# Patient Record
Sex: FEMALE | Race: WHITE | Employment: UNEMPLOYED | URBAN - NONMETROPOLITAN AREA
[De-identification: names, ages, dates, MRNs, and addresses within clinical notes are randomized per-mention and may not be internally consistent; named-entity substitution may affect disease eponyms.]

---

## 2021-12-20 ENCOUNTER — HOSPITAL ENCOUNTER (OUTPATIENT)
Age: 21
Setting detail: SPECIMEN
Discharge: HOME | End: 2021-12-20
Payer: COMMERCIAL

## 2021-12-20 ENCOUNTER — OFFICE VISIT (OUTPATIENT)
Dept: FAMILY MEDICINE CLINIC | Age: 21
End: 2021-12-20
Payer: COMMERCIAL

## 2021-12-20 VITALS
WEIGHT: 158 LBS | OXYGEN SATURATION: 98 % | TEMPERATURE: 99.1 F | DIASTOLIC BLOOD PRESSURE: 82 MMHG | SYSTOLIC BLOOD PRESSURE: 122 MMHG | RESPIRATION RATE: 14 BRPM | BODY MASS INDEX: 29.08 KG/M2 | HEIGHT: 62 IN | HEART RATE: 102 BPM

## 2021-12-20 DIAGNOSIS — J06.9 VIRAL URI: ICD-10-CM

## 2021-12-20 DIAGNOSIS — R05.9 COUGH: ICD-10-CM

## 2021-12-20 DIAGNOSIS — J06.9 VIRAL URI: Primary | ICD-10-CM

## 2021-12-20 PROBLEM — T15.01XA FOREIGN BODY OF RIGHT CORNEA: Status: ACTIVE | Noted: 2021-09-03

## 2021-12-20 LAB
INFLUENZA A ANTIBODY: NEGATIVE
INFLUENZA B ANTIBODY: NEGATIVE

## 2021-12-20 PROCEDURE — U0003 INFECTIOUS AGENT DETECTION BY NUCLEIC ACID (DNA OR RNA); SEVERE ACUTE RESPIRATORY SYNDROME CORONAVIRUS 2 (SARS-COV-2) (CORONAVIRUS DISEASE [COVID-19]), AMPLIFIED PROBE TECHNIQUE, MAKING USE OF HIGH THROUGHPUT TECHNOLOGIES AS DESCRIBED BY CMS-2020-01-R: HCPCS

## 2021-12-20 PROCEDURE — 99203 OFFICE O/P NEW LOW 30 MIN: CPT | Performed by: NURSE PRACTITIONER

## 2021-12-20 PROCEDURE — U0005 INFEC AGEN DETEC AMPLI PROBE: HCPCS

## 2021-12-20 PROCEDURE — 87804 INFLUENZA ASSAY W/OPTIC: CPT | Performed by: NURSE PRACTITIONER

## 2021-12-20 SDOH — ECONOMIC STABILITY: FOOD INSECURITY: WITHIN THE PAST 12 MONTHS, YOU WORRIED THAT YOUR FOOD WOULD RUN OUT BEFORE YOU GOT MONEY TO BUY MORE.: NEVER TRUE

## 2021-12-20 SDOH — ECONOMIC STABILITY: FOOD INSECURITY: WITHIN THE PAST 12 MONTHS, THE FOOD YOU BOUGHT JUST DIDN'T LAST AND YOU DIDN'T HAVE MONEY TO GET MORE.: NEVER TRUE

## 2021-12-20 ASSESSMENT — ENCOUNTER SYMPTOMS
DIARRHEA: 0
COUGH: 1
SORE THROAT: 1
NAUSEA: 0
RHINORRHEA: 1
VOMITING: 0

## 2021-12-20 ASSESSMENT — PATIENT HEALTH QUESTIONNAIRE - PHQ9
2. FEELING DOWN, DEPRESSED OR HOPELESS: 0
SUM OF ALL RESPONSES TO PHQ QUESTIONS 1-9: 0
1. LITTLE INTEREST OR PLEASURE IN DOING THINGS: 0
SUM OF ALL RESPONSES TO PHQ QUESTIONS 1-9: 0
SUM OF ALL RESPONSES TO PHQ9 QUESTIONS 1 & 2: 0
SUM OF ALL RESPONSES TO PHQ QUESTIONS 1-9: 0

## 2021-12-20 ASSESSMENT — SOCIAL DETERMINANTS OF HEALTH (SDOH): HOW HARD IS IT FOR YOU TO PAY FOR THE VERY BASICS LIKE FOOD, HOUSING, MEDICAL CARE, AND HEATING?: NOT HARD AT ALL

## 2021-12-20 NOTE — PROGRESS NOTES
[address], APRN-CNP  [address]  Phone:  [phone]  Fax:  [phone]  Deion Atkins is a 22 y.o. female who presents today for her medical conditions/complaints as noted below. Deion Atkins c/o of Cough (mirgaines, nasal congestion, body aches, cough, s/s started last tuesday)      HPI:     URI   This is a new problem. The current episode started in the past 7 days (12/14). The problem has been waxing and waning. There has been no fever. Associated symptoms include coughing, headaches, rhinorrhea and a sore throat. Pertinent negatives include no diarrhea, nausea or vomiting. Wt Readings from Last 3 Encounters:   12/20/21 158 lb (71.7 kg)   03/19/13 84 lb (38.1 kg) (48 %, Z= -0.06)*   11/27/12 80 lb 8 oz (36.5 kg) (46 %, Z= -0.09)*     * Growth percentiles are based on CDC (Girls, 2-20 Years) data. Temp Readings from Last 3 Encounters:   12/20/21 99.1 °F (37.3 °C)   03/19/13 97.9 °F (36.6 °C) (Oral)       BP Readings from Last 3 Encounters:   12/20/21 122/82   03/19/13 90/62 (10 %, Z = -1.28 /  55 %, Z = 0.13)*   11/27/12 102/60 (59 %, Z = 0.23 /  51 %, Z = 0.03)*     *BP percentiles are based on the 2017 AAP Clinical Practice Guideline for girls       Pulse Readings from Last 3 Encounters:   12/20/21 102   03/19/13 80   11/27/12 72        SpO2 Readings from Last 3 Encounters:   12/20/21 98%             Past Medical History:   Diagnosis Date    Abdominal pain     Allergy     Allergies    Diarrhea     Dysphagia     GERD (gastroesophageal reflux disease)     Headache(784.0)     Reflux     Vomiting       Past Surgical History:   Procedure Laterality Date    TONSILLECTOMY       History reviewed. No pertinent family history. Social History     Tobacco Use    Smoking status: Never Smoker    Smokeless tobacco: Never Used   Substance Use Topics    Alcohol use: Never      No current outpatient medications on file.      No Findings: No rash. Neurological:      Mental Status: She is alert and oriented to person, place, and time. Psychiatric:         Judgment: Judgment normal.         Assessment:      Diagnosis Orders   1. Viral URI  COVID-19   2. Cough  POCT Influenza A/B    COVID-19     No results found for this visit on 12/20/21. Influenza negative. Plan:       COVID swab was obtained. COVID work or school note given. Remain in quarantine until results are back. Please go to the emergency room if you become short of breath, have weakness or extreme fatigue or if you feel you may be dehydrated. You may call the office if it is during normal business hours and request a nurse visit where we check you pulse oximetry/oxygen level. If your level is too low you will be sent to the hospital for further treatment. You are being provided a work or school excuse so you may quarantine until you test results are back. If you are COVID positive you will be given an additional excuse to lengthen your quarantine. Practice coughing and deep breathing every hour while awake. If you are laying down, change positions frequently. Try laying on your stomach to open up your lungs more. If you are allowed to take tylenol or ibuprofen you may take these to relieve fever, chills or body aches. Follow up with primary care provider in 1 to 2 days if needed. There are no Patient Instructions on file for this visit. Patient/Caregiver instructed on use, benefit, and side effects of prescribed medications. All patient/parent/caregiver questions answered. Patient/parent/caregiver voiced understanding. Reviewed health maintenance. Instructed to continue current medications, diet and exercise. Patient agreed with treatment plan. Follow up as directed.            Electronically signed by JENNIFER Thomason NP on12/20/2021

## 2021-12-20 NOTE — PATIENT INSTRUCTIONS
COVID swab was obtained. COVID work or school note given. Remain in quarantine until results are back. Please go to the emergency room if you become short of breath, have weakness or extreme fatigue or if you feel you may be dehydrated. You may call the office if it is during normal business hours and request a nurse visit where we check you pulse oximetry/oxygen level. If your level is too low you will be sent to the hospital for further treatment. You are being provided a work or school excuse so you may quarantine until you test results are back. If you are COVID positive you will be given an additional excuse to lengthen your quarantine. Practice coughing and deep breathing every hour while awake. If you are laying down, change positions frequently. Try laying on your stomach to open up your lungs more. If you are allowed to take tylenol or ibuprofen you may take these to relieve fever, chills or body aches. Follow up with primary care provider in 1 to 2 days if needed. Patient Education        10 Things to Do When You Have COVID-19    Stay home. Don't go to school, work, or public areas. And don't use public transportation, ride-shares, or taxis unless you have no choice. Leave your home only if you need to get medical care. But call the doctor's office first so they know you're coming. And wear a mask. Ask before leaving isolation. Follow your doctor's advice about when it is safe for you to leave isolation. Wear a mask when you are around other people. It can help stop the spread of the virus. Limit contact with people in your home. If possible, stay in a separate bedroom and use a separate bathroom. Avoid contact with pets and other animals. If possible, have a friend or family member care for them while you're sick. Cover your mouth and nose with a tissue when you cough or sneeze. Then throw the tissue in the trash right away.      Wash your hands often, especially after you cough or sneeze. Use soap and water, and scrub for at least 20 seconds. If soap and water aren't available, use an alcohol-based hand . Don't share personal household items. These include bedding, towels, cups and glasses, and eating utensils. Clean and disinfect your home every day. Use household  or disinfectant wipes or sprays. If needed, take acetaminophen (Tylenol) or ibuprofen (Advil, Motrin) to relieve fever and body aches. Read and follow all instructions on the label. Current as of: March 26, 2021               Content Version: 13.0  © 6004-7885 [employer]. Care instructions adapted under license by [facility] ([facility]). If you have questions about a medical condition or this instruction, always ask your healthcare professional. [place] [address] any warranty or liability for your use of this information. Patient Education        Learning About Coronavirus [phone])  What is coronavirus (COVID-19)? COVID-19 is a disease caused by a type of coronavirus. This illness was first found in December 2019. It has since spread worldwide. Coronaviruses are a large group of viruses. They cause the common cold. They also cause more serious illnesses like [place] respiratory syndrome (MERS) and severe acute respiratory syndrome (SARS). COVID-19 is caused by a novel coronavirus. That means it's a new type that has not been seen in people before. What are the symptoms? COVID-19 symptoms may include:  · Fever. · Cough. · Trouble breathing. · Chills or repeated shaking with chills. · Muscle and body aches. · Headache. · Sore throat. · New loss of taste or smell. · Vomiting. · Diarrhea. In severe cases, COVID-19 can cause pneumonia and make it hard to breathe without help from a machine. It can cause death. How is it diagnosed?   COVID-19 is diagnosed with a viral test. This may also be called a PCR test or antigen test. It looks for evidence of the virus in your breathing passages or lungs (respiratory system). The test is most often done on a sample from the nose, throat, or lungs. It's sometimes done on a sample of saliva. One way a sample is collected is by putting a long swab into the back of your nose. How is it treated? Mild cases of COVID-19 can be treated at home. Serious cases need treatment in the hospital. Treatment may include medicines to reduce symptoms, plus breathing support such as oxygen therapy or a ventilator. Some people may be placed on their belly to help their oxygen levels. Treatments that may help people who have COVID-19 include:  Antiviral medicines. These medicines treat viral infections. Remdesivir is an example. Immune-based therapy. These medicines help the immune system fight COVID-19. Examples include monoclonal antibodies. Blood thinners. These medicines help prevent blood clots. People with severe illness are at risk for blood clots. How can you protect yourself and others? The best way to protect yourself from getting sick is to:  · Get vaccinated. · Avoid sick people. · If you are not fully vaccinated:  ? Wear a mask if you have to go to public areas. ? Avoid crowds and try to stay at least 6 feet away from other people. · Cover your mouth with a tissue when you cough or sneeze. · Wash your hands often, especially after you cough or sneeze. Use soap and water, and scrub for at least 20 seconds. If soap and water aren't available, use an alcohol-based hand . · Avoid touching your mouth, nose, and eyes. To help avoid spreading the virus to others:  · Get vaccinated. · Cover your mouth with a tissue when you cough or sneeze. · Wash your hands often, especially after you cough or sneeze. Use soap and water, and scrub for at least 20 seconds. If soap and water aren't available, use an alcohol-based hand .   · If you have been exposed to the virus and are not fully vaccinated:  ? Stay home. Don't go to school, work, or public areas. And don't use public transportation, ride-shares, or taxis unless you have no choice. ? Wear a mask if you have to go to public areas, like the pharmacy. · If you're sick:  ? Leave your home only if you need to get medical care. But call the doctor's office first so they know you're coming. And wear a mask. ? Wear a mask whenever you're around other people. ? Limit contact with pets and people in your home. If possible, stay in a separate bedroom and use a separate bathroom. ? Clean and disinfect your home every day. Use household  and disinfectant wipes or sprays. Take special care to clean things that you touch with your hands. How can you self-isolate when you have COVID-19? If you have COVID-19, there are things you can do to help avoid spreading the virus to others. · Limit contact with people in your home. If possible, stay in a separate bedroom and use a separate bathroom. · Wear a mask when you are around other people. · If you have to leave home, avoid crowds and try to stay at least 6 feet away from other people. · Avoid contact with pets and other animals. · Cover your mouth and nose with a tissue when you cough or sneeze. Then throw it in the trash right away. · Wash your hands often, especially after you cough or sneeze. Use soap and water, and scrub for at least 20 seconds. If soap and water aren't available, use an alcohol-based hand . · Don't share personal household items. These include bedding, towels, cups and glasses, and eating utensils. · [address] in the warmest water allowed for the fabric type, and dry it completely. It's okay to wash other people's laundry with yours. · Clean and disinfect your home. Use household  and disinfectant wipes or sprays. When should you call for help? Call 911 anytime you think you may need emergency care.  For example, call if you have life-threatening symptoms, such as:    · You have severe trouble breathing. (You can't talk at all.)     · You have constant chest pain or pressure.     · You are severely dizzy or lightheaded.     · You are confused or can't think clearly.     · You have pale, gray, or blue-colored skin or lips.     · You pass out (lose consciousness) or are very hard to wake up. Call your doctor now or seek immediate medical care if:    · You have moderate trouble breathing. (You can't speak a full sentence.)     · You are coughing up blood (more than about 1 teaspoon).     · You have signs of low blood pressure. These include feeling lightheaded; being too weak to stand; and having cold, pale, clammy skin. Watch closely for changes in your health, and be sure to contact your doctor if:    · Your symptoms get worse.     · You are not getting better as expected.     · You have new or worse symptoms of anxiety, depression, nightmares, or flashbacks. Call before you go to the doctor's office. Follow their instructions. And wear a mask. Current as of: July 1, 2021               Content Version: 13.0  © 2006-2021 [employer]. Care instructions adapted under license by [facility] ([facility]). If you have questions about a medical condition or this instruction, always ask your healthcare professional. [place] [address] any warranty or liability for your use of this information. Patient Education        Coronavirus (SZUEF-07): Care Instructions  Overview  The coronavirus disease (COVID-19) is caused by a virus. Symptoms may include a fever, a cough, and shortness of breath. It can spread through droplets from coughing and sneezing, breathing, and singing. The virus also can spread when people are in close contact with someone who is infected. Most people have mild symptoms and can take care of themselves at home.  If their symptoms get worse, they may need care in a hospital. Treatment may include medicines to reduce symptoms, plus breathing support such as oxygen therapy or a ventilator. It's important to not spread the virus to others. If you have COVID-19, wear a mask anytime you are around other people. It can help stop the spread of the virus. You need to isolate yourself while you are sick. Leave your home only if you need to get medical care or testing. Follow-up care is a key part of your treatment and safety. Be sure to make and go to all appointments, and call your doctor if you are having problems. It's also a good idea to know your test results and keep a list of the medicines you take. How can you care for yourself at home? · Get extra rest. It can help you feel better. · Drink plenty of fluids. This helps replace fluids lost from fever. Fluids may also help ease a scratchy throat. · You can take acetaminophen (Tylenol) or ibuprofen (Advil, Motrin) to reduce a fever. It may also help with muscle and body aches. Read and follow all instructions on the label. · Use petroleum jelly on sore skin. This can help if the skin around your nose and lips becomes sore from rubbing a lot with tissues. If you use oxygen, use a water-based product instead of petroleum jelly. · Keep track of symptoms such as fever and shortness of breath. This can help you know if you need to call your doctor. It can also help you know when it's safe to be around other people. · In some cases, your doctor might suggest that you get a pulse oximeter. How can you self-isolate when you have COVID-19? If you have COVID-19, there are things you can do to help avoid spreading the virus to others. · Limit contact with people in your home. If possible, stay in a separate bedroom and use a separate bathroom. · Wear a mask when you are around other people. · If you have to leave home, avoid crowds and try to stay at least 6 feet away from other people. · Avoid contact with pets and other animals.   · Cover your mouth and nose with a tissue when you cough or sneeze. Then throw it in the trash right away. · Wash your hands often, especially after you cough or sneeze. Use soap and water, and scrub for at least 20 seconds. If soap and water aren't available, use an alcohol-based hand . · Don't share personal household items. These include bedding, towels, cups and glasses, and eating utensils. · [address] in the warmest water allowed for the fabric type, and dry it completely. It's okay to wash other people's laundry with yours. · Clean and disinfect your home. Use household  and disinfectant wipes or sprays. When can you end self-isolation for COVID-19? If you know or think that you have the virus, you will need to self-isolate. You can be around others after:  · It's been at least 10 days since your symptoms started and  · You haven't had a fever for 24 hours without taking medicines to lower the fever and  · Your symptoms are improving. If you tested positive but have no symptoms, you can end isolation after 10 days. But if you start to have symptoms, follow the steps above. Ask your doctor if you need to be tested before you end isolation. This is especially important if you have a weakened immune system. When should you call for help? Call 911 anytime you think you may need emergency care. For example, call if you have life-threatening symptoms, such as:    · You have severe trouble breathing. (You can't talk at all.)     · You have constant chest pain or pressure.     · You are severely dizzy or lightheaded.     · You are confused or can't think clearly.     · You have pale, gray, or blue-colored skin or lips.     · You pass out (lose consciousness) or are very hard to wake up. Call your doctor now or seek immediate medical care if:    · You have moderate trouble breathing.  (You can't speak a full sentence.)     · You are coughing up blood (more than about 1 teaspoon).     · You have signs of low blood pressure. These include feeling lightheaded; being too weak to stand; and having cold, pale, clammy skin. Watch closely for changes in your health, and be sure to contact your doctor if:    · Your symptoms get worse.     · You are not getting better as expected.     · You have new or worse symptoms of anxiety, depression, nightmares, or flashbacks. Call before you go to the doctor's office. Follow their instructions. And wear a mask. Current as of: July 1, 2021               Content Version: 13.0  © 2006-2021 [employer]. Care instructions adapted under license by [facility] ([facility]). If you have questions about a medical condition or this instruction, always ask your healthcare professional. [address] any warranty or liability for your use of this information. Patient Education        Learning About COVID-19 and Social Distancing  What is it? Social distancing means putting space between yourself and other people. The recommended distance is 6 feet, or about 2 meters. This also means staying away from any place where people may gather, such as gomes or other public gathering places. Why is it important? Social distancing is the best way to reduce the spread of COVID-19. This virus seems to spread from person to person through droplets from coughing and sneezing. So if you keep your distance from others, you're less likely to get it or spread it. And social distancing is important for everyone, not just those who are at high risk of infection, like older people. You might have the virus but not have symptoms. You could then give the infection to someone you come into contact with. How is it done? Experts recommend putting at least 6 feet (2 meters) between you and other people. And wear masks if you are around people you don't live with. So follow this advice, if possible. · Work from home. · Avoid having visitors.    If you have to have visitors, they need to wear a mask and stay at least 6 feet (2 meters) away from you. And keep the visit as short as possible. · Increase airflow in your home if people visit. Here's how:  ? If you can, open some windows or doors to the outside. ? Use a fan to blow air away from people and out a window. ? Turn on exhaust fans in your kitchen and bathroom. Doing these things can help reduce the amount of virus particles (droplets) that travel through the air. · Don't travel if you don't have to. And avoid public transportation, ride-shares, and taxis unless you have no choice. · Limit shopping to essentials, like food and medicines. · Don't eat in restaurants. You can still get takeout or food deliveries. · Avoid crowds and busy places. Be sure to follow all instructions from your local health authorities. Where can you learn more? Go to [email or web address]://[employer]. org and sign in to your Groupsite account. Enter A133 in the Mechio box to learn more about \"Learning About COVID-19 and Social Distancing. \"     If you do not have an account, please click on the \"Sign Up Now\" link. Current as of: March 26, 2021               Content Version: 12.9  © 1415-0651 Healthwise[employer]. Care instructions adapted under license by [facility] ([facility]). If you have questions about a medical condition or this instruction, always ask your healthcare professional. [place] [address] any warranty or liability for your use of this information.

## 2021-12-22 LAB
SARS-COV-2: NORMAL
SARS-COV-2: NOT DETECTED
SOURCE: NORMAL

## 2022-07-25 ENCOUNTER — HOSPITAL ENCOUNTER (OUTPATIENT)
Age: 22
Setting detail: SPECIMEN
Discharge: HOME | End: 2022-07-25
Payer: COMMERCIAL

## 2022-07-25 ENCOUNTER — OFFICE VISIT (OUTPATIENT)
Dept: FAMILY MEDICINE CLINIC | Age: 22
End: 2022-07-25
Payer: COMMERCIAL

## 2022-07-25 VITALS
SYSTOLIC BLOOD PRESSURE: 114 MMHG | WEIGHT: 171 LBS | DIASTOLIC BLOOD PRESSURE: 80 MMHG | BODY MASS INDEX: 31.28 KG/M2 | OXYGEN SATURATION: 97 % | HEART RATE: 104 BPM

## 2022-07-25 DIAGNOSIS — N30.01 ACUTE CYSTITIS WITH HEMATURIA: Primary | ICD-10-CM

## 2022-07-25 PROCEDURE — G8427 DOCREV CUR MEDS BY ELIG CLIN: HCPCS

## 2022-07-25 PROCEDURE — 99213 OFFICE O/P EST LOW 20 MIN: CPT

## 2022-07-25 PROCEDURE — 87086 URINE CULTURE/COLONY COUNT: CPT

## 2022-07-25 PROCEDURE — G8417 CALC BMI ABV UP PARAM F/U: HCPCS

## 2022-07-25 PROCEDURE — 87077 CULTURE AEROBIC IDENTIFY: CPT

## 2022-07-25 PROCEDURE — 81002 URINALYSIS NONAUTO W/O SCOPE: CPT

## 2022-07-25 PROCEDURE — 1036F TOBACCO NON-USER: CPT

## 2022-07-25 PROCEDURE — 86403 PARTICLE AGGLUT ANTBDY SCRN: CPT

## 2022-07-25 RX ORDER — PHENAZOPYRIDINE HYDROCHLORIDE 100 MG/1
100 TABLET, FILM COATED ORAL 3 TIMES DAILY PRN
Qty: 9 TABLET | Refills: 0 | Status: SHIPPED | OUTPATIENT
Start: 2022-07-25 | End: 2023-07-25

## 2022-07-25 RX ORDER — NITROFURANTOIN 25; 75 MG/1; MG/1
100 CAPSULE ORAL 2 TIMES DAILY
Qty: 20 CAPSULE | Refills: 0 | Status: SHIPPED | OUTPATIENT
Start: 2022-07-25 | End: 2022-08-04

## 2022-07-25 ASSESSMENT — ENCOUNTER SYMPTOMS
SINUS PRESSURE: 0
DIARRHEA: 0
CONSTIPATION: 0
SHORTNESS OF BREATH: 0
CHEST TIGHTNESS: 0
SINUS PAIN: 0
COUGH: 0
ABDOMINAL PAIN: 0

## 2022-07-25 ASSESSMENT — PATIENT HEALTH QUESTIONNAIRE - PHQ9
SUM OF ALL RESPONSES TO PHQ QUESTIONS 1-9: 0
2. FEELING DOWN, DEPRESSED OR HOPELESS: 0
SUM OF ALL RESPONSES TO PHQ9 QUESTIONS 1 & 2: 0
1. LITTLE INTEREST OR PLEASURE IN DOING THINGS: 0
SUM OF ALL RESPONSES TO PHQ QUESTIONS 1-9: 0

## 2022-07-25 NOTE — PROGRESS NOTES
Brenda Shrestha (:  ) is a 22 y.o. female,Established patient, here for evaluation of the following chief complaint(s):  Urinary Tract Infection (Patient is here today for uti symptoms including burning with urination and flank pain. This started Friday and she did try uricalm but did not fully help)         ASSESSMENT/PLAN:  1. Acute cystitis with hematuria  -     POCT Urinalysis no Micro  -     Culture, Urine; Future  -     nitrofurantoin, macrocrystal-monohydrate, (MACROBID) 100 MG capsule; Take 1 capsule by mouth in the morning and 1 capsule before bedtime. Do all this for 10 days. , Disp-20 capsule, R-0Normal  -     phenazopyridine (PYRIDIUM) 100 MG tablet; Take 1 tablet by mouth 3 times daily as needed for Pain, Disp-9 tablet, R-0Normal      No follow-ups on file. Subjective   SUBJECTIVE/OBJECTIVE:  Fahad Rowland is here today with a complaint of dysuria, increased frequency. This has been ongoing since last Friday, she had had 1 urinary tract infection in the past, is using over-the-counter medications to help with what she believes to be urinary tract infection. POCT testing of urine does show nitrates and is grossly positive for urinary tract infection. Antibiotic is discussed as well as urine culture, will place her on Macrobid for 10 days, pending urine culture. She is informed that we will call her if this medication is not effective in treating her urinary tract infection. She is encouraged to drink lots of fluids, and given a prescription for Pyridium. She is to stop over-the-counter medications and to only use Pyridium for the next 24 to 48 hours, warning was also given about change in urine color with this medication. Fahad Rowland denies any questions, will follow up with PCP in let the office know if her symptoms or not resolving in the next 24 to 48 hours. Review of Systems   Constitutional:  Negative for chills, fatigue and fever.    HENT:  Negative for congestion, sinus pressure and sinus pain. Respiratory:  Negative for cough, chest tightness and shortness of breath. Cardiovascular:  Negative for chest pain. Gastrointestinal:  Negative for abdominal pain, constipation and diarrhea. Genitourinary:  Positive for dysuria, frequency and urgency. Negative for pelvic pain, vaginal discharge and vaginal pain. Neurological:  Negative for dizziness and light-headedness. Objective   Physical Exam  Constitutional:       Appearance: Normal appearance. HENT:      Head: Normocephalic and atraumatic. Cardiovascular:      Rate and Rhythm: Normal rate and regular rhythm. Pulses: Normal pulses. Heart sounds: Normal heart sounds. Pulmonary:      Effort: Pulmonary effort is normal.      Breath sounds: Normal breath sounds. Abdominal:      General: Bowel sounds are normal.      Tenderness: There is no right CVA tenderness or left CVA tenderness. Musculoskeletal:      Cervical back: Normal range of motion. Skin:     General: Skin is warm and dry. Capillary Refill: Capillary refill takes less than 2 seconds. Neurological:      Mental Status: She is alert. Psychiatric:         Mood and Affect: Mood normal.                An electronic signature was used to authenticate this note.     --Darrick Harrell, JENNIFER - CNP

## 2022-07-26 DIAGNOSIS — N30.01 ACUTE CYSTITIS WITH HEMATURIA: ICD-10-CM

## 2022-07-29 LAB
CULTURE: ABNORMAL
SPECIMEN DESCRIPTION: ABNORMAL

## 2023-06-01 ENCOUNTER — OFFICE VISIT (OUTPATIENT)
Dept: FAMILY MEDICINE CLINIC | Age: 23
End: 2023-06-01
Payer: COMMERCIAL

## 2023-06-01 VITALS
HEART RATE: 113 BPM | OXYGEN SATURATION: 96 % | SYSTOLIC BLOOD PRESSURE: 102 MMHG | TEMPERATURE: 99.5 F | DIASTOLIC BLOOD PRESSURE: 60 MMHG | HEIGHT: 62 IN | BODY MASS INDEX: 31.65 KG/M2 | WEIGHT: 172 LBS

## 2023-06-01 DIAGNOSIS — K52.9 ACUTE GASTROENTERITIS: Primary | ICD-10-CM

## 2023-06-01 PROCEDURE — G8427 DOCREV CUR MEDS BY ELIG CLIN: HCPCS

## 2023-06-01 PROCEDURE — G8417 CALC BMI ABV UP PARAM F/U: HCPCS

## 2023-06-01 PROCEDURE — 1036F TOBACCO NON-USER: CPT

## 2023-06-01 PROCEDURE — 99213 OFFICE O/P EST LOW 20 MIN: CPT

## 2023-06-01 RX ORDER — ONDANSETRON 4 MG/1
4 TABLET, ORALLY DISINTEGRATING ORAL 3 TIMES DAILY PRN
Qty: 21 TABLET | Refills: 0 | Status: SHIPPED | OUTPATIENT
Start: 2023-06-01

## 2023-06-01 RX ORDER — ONDANSETRON 4 MG/1
4 TABLET, ORALLY DISINTEGRATING ORAL ONCE
Status: COMPLETED | OUTPATIENT
Start: 2023-06-01 | End: 2023-06-01

## 2023-06-01 RX ADMIN — ONDANSETRON 4 MG: 4 TABLET, ORALLY DISINTEGRATING ORAL at 14:57

## 2023-06-01 ASSESSMENT — ENCOUNTER SYMPTOMS: VOMITING: 1

## 2023-06-07 ASSESSMENT — ENCOUNTER SYMPTOMS
DIARRHEA: 1
COUGH: 0
COLOR CHANGE: 0
NAUSEA: 1
SHORTNESS OF BREATH: 0
WHEEZING: 0

## 2023-09-25 ENCOUNTER — OFFICE VISIT (OUTPATIENT)
Dept: FAMILY MEDICINE CLINIC | Age: 23
End: 2023-09-25
Payer: COMMERCIAL

## 2023-09-25 VITALS
BODY MASS INDEX: 32.36 KG/M2 | OXYGEN SATURATION: 98 % | WEIGHT: 182.6 LBS | TEMPERATURE: 97.2 F | SYSTOLIC BLOOD PRESSURE: 142 MMHG | HEIGHT: 63 IN | DIASTOLIC BLOOD PRESSURE: 82 MMHG | HEART RATE: 111 BPM

## 2023-09-25 DIAGNOSIS — H00.024 HORDEOLUM INTERNUM OF LEFT UPPER EYELID: Primary | ICD-10-CM

## 2023-09-25 DIAGNOSIS — R03.0 ELEVATED BLOOD PRESSURE READING IN OFFICE WITHOUT DIAGNOSIS OF HYPERTENSION: ICD-10-CM

## 2023-09-25 PROCEDURE — G8417 CALC BMI ABV UP PARAM F/U: HCPCS | Performed by: NURSE PRACTITIONER

## 2023-09-25 PROCEDURE — G8427 DOCREV CUR MEDS BY ELIG CLIN: HCPCS | Performed by: NURSE PRACTITIONER

## 2023-09-25 PROCEDURE — 99213 OFFICE O/P EST LOW 20 MIN: CPT | Performed by: NURSE PRACTITIONER

## 2023-09-25 PROCEDURE — 1036F TOBACCO NON-USER: CPT | Performed by: NURSE PRACTITIONER

## 2023-09-25 RX ORDER — ERYTHROMYCIN 5 MG/G
OINTMENT OPHTHALMIC
Qty: 3.5 G | Refills: 0 | Status: SHIPPED | OUTPATIENT
Start: 2023-09-25

## 2023-09-25 SDOH — ECONOMIC STABILITY: INCOME INSECURITY: HOW HARD IS IT FOR YOU TO PAY FOR THE VERY BASICS LIKE FOOD, HOUSING, MEDICAL CARE, AND HEATING?: NOT HARD AT ALL

## 2023-09-25 SDOH — ECONOMIC STABILITY: FOOD INSECURITY: WITHIN THE PAST 12 MONTHS, YOU WORRIED THAT YOUR FOOD WOULD RUN OUT BEFORE YOU GOT MONEY TO BUY MORE.: NEVER TRUE

## 2023-09-25 SDOH — ECONOMIC STABILITY: FOOD INSECURITY: WITHIN THE PAST 12 MONTHS, THE FOOD YOU BOUGHT JUST DIDN'T LAST AND YOU DIDN'T HAVE MONEY TO GET MORE.: NEVER TRUE

## 2023-09-25 SDOH — ECONOMIC STABILITY: HOUSING INSECURITY
IN THE LAST 12 MONTHS, WAS THERE A TIME WHEN YOU DID NOT HAVE A STEADY PLACE TO SLEEP OR SLEPT IN A SHELTER (INCLUDING NOW)?: NO

## 2023-09-25 ASSESSMENT — PATIENT HEALTH QUESTIONNAIRE - PHQ9
SUM OF ALL RESPONSES TO PHQ QUESTIONS 1-9: 0
SUM OF ALL RESPONSES TO PHQ9 QUESTIONS 1 & 2: 0
1. LITTLE INTEREST OR PLEASURE IN DOING THINGS: 0
2. FEELING DOWN, DEPRESSED OR HOPELESS: 0

## 2023-09-25 ASSESSMENT — ENCOUNTER SYMPTOMS
EYE PAIN: 1
DOUBLE VISION: 0
EYE DISCHARGE: 1
BLURRED VISION: 0
FOREIGN BODY SENSATION: 1
EYE ITCHING: 1
EYE REDNESS: 1

## 2023-09-25 ASSESSMENT — VISUAL ACUITY: OU: 1

## 2023-09-25 NOTE — PATIENT INSTRUCTIONS
Stye to the left upper eye lid. Will treat with Erythromycin ointment. You are advised to wash hand before and after using eye ointment. They should avoid touching the face and to wash hands more frequently. May use a clean warm wet washcloth as a compress to the eyes 3-4 times a day for comfort and relieve matting of the eyes.

## 2023-09-25 NOTE — PROGRESS NOTES
[facility] department of [facility]  [address] [address]  Phone: [phone]  Fax: [phone]      Miller Saint is a 25 y.o. female who presents to the [address] [place] [address] today for her medical conditions/complaints as noted below. Miller Saint is c/o of Eye Problem (Left eye irritation started yesterday & woke up with some swelling today. Occasional dried drainage.)      Assessment and Plan     1. Hordeolum internum of left upper eyelid  - erythromycin (ROMYCIN) 5 MG/GM ophthalmic ointment; APPLY HALF INCH RIBBON IN LOWER EYELID OF AFFECTED EYE(S) TWICE DAILY FOR 7 DAYS. Dispense: 3.5 g; Refill: 0  Stye to the left upper eye lid. Will treat with Erythromycin ointment. You are advised to wash hand before and after using eye ointment. They should avoid touching the face and to wash hands more frequently. May use a clean warm wet washcloth as a compress to the eyes 3-4 times a day for comfort and relieve matting of the eyes. 2. Elevated blood pressure reading in office without diagnosis of hypertension  BP remained elevated at recheck. No history of HTN per pateint. She was given education on DASH diet and advised to follow up with PCP for recheck of blood pressure in the near future. Denies complaints of HA, Chest pain or sob. Off work note given with return to work date 9/26/2023        Subjective:   Eye Problem   The left eye is affected. This is a new problem. The current episode started yesterday. The problem occurs constantly. The problem has been gradually worsening. There was no injury mechanism. There is No known exposure to pink eye. Associated symptoms include an eye discharge (clear and slightly crusty in the morning.), eye redness, a foreign body sensation and itching. Pertinent negatives include no blurred vision, double vision or fever. She has tried eye drops for the symptoms.  The treatment provided no

## 2023-10-24 ENCOUNTER — TELEPHONE (OUTPATIENT)
Dept: FAMILY MEDICINE CLINIC | Age: 23
End: 2023-10-24

## 2023-10-24 ENCOUNTER — OFFICE VISIT (OUTPATIENT)
Dept: FAMILY MEDICINE CLINIC | Age: 23
End: 2023-10-24
Payer: COMMERCIAL

## 2023-10-24 VITALS
BODY MASS INDEX: 32.85 KG/M2 | RESPIRATION RATE: 16 BRPM | HEIGHT: 63 IN | WEIGHT: 185.4 LBS | SYSTOLIC BLOOD PRESSURE: 124 MMHG | TEMPERATURE: 98.7 F | HEART RATE: 104 BPM | DIASTOLIC BLOOD PRESSURE: 74 MMHG | OXYGEN SATURATION: 98 %

## 2023-10-24 DIAGNOSIS — R51.9 SINUS HEADACHE: Primary | ICD-10-CM

## 2023-10-24 PROCEDURE — G8484 FLU IMMUNIZE NO ADMIN: HCPCS | Performed by: NURSE PRACTITIONER

## 2023-10-24 PROCEDURE — 99213 OFFICE O/P EST LOW 20 MIN: CPT | Performed by: NURSE PRACTITIONER

## 2023-10-24 PROCEDURE — 1036F TOBACCO NON-USER: CPT | Performed by: NURSE PRACTITIONER

## 2023-10-24 PROCEDURE — G8427 DOCREV CUR MEDS BY ELIG CLIN: HCPCS | Performed by: NURSE PRACTITIONER

## 2023-10-24 PROCEDURE — G8417 CALC BMI ABV UP PARAM F/U: HCPCS | Performed by: NURSE PRACTITIONER

## 2023-10-24 RX ORDER — ACETAMINOPHEN 500 MG
500 TABLET ORAL EVERY 6 HOURS PRN
COMMUNITY

## 2023-10-24 ASSESSMENT — PATIENT HEALTH QUESTIONNAIRE - PHQ9
SUM OF ALL RESPONSES TO PHQ9 QUESTIONS 1 & 2: 0
1. LITTLE INTEREST OR PLEASURE IN DOING THINGS: 0
2. FEELING DOWN, DEPRESSED OR HOPELESS: 0
SUM OF ALL RESPONSES TO PHQ QUESTIONS 1-9: 0

## 2023-10-24 ASSESSMENT — ENCOUNTER SYMPTOMS
RHINORRHEA: 1
COUGH: 0
ABDOMINAL PAIN: 0

## 2023-10-24 NOTE — TELEPHONE ENCOUNTER
Spoke to patient, appt scheduled. ----- Message from Abdi Beach sent at 10/24/2023 11:24 AM EDT -----  Subject: Message to Provider    QUESTIONS  Information for Provider? Pt would like to see if she can get a doctors   excuse for missing work yesterday 10/23 for a migraine. Wasn't able to   come in for an appt due to not having the money for an appt. Would like to   pick this up.   ---------------------------------------------------------------------------  --------------  Jesus Alberto SIM  [phone]; OK to leave message on voicemail  ---------------------------------------------------------------------------  --------------  SCRIPT ANSWERS  Relationship to Patient?  Self

## 2024-01-17 ENCOUNTER — OFFICE VISIT (OUTPATIENT)
Dept: FAMILY MEDICINE CLINIC | Age: 24
End: 2024-01-17
Payer: COMMERCIAL

## 2024-01-17 VITALS
HEART RATE: 78 BPM | SYSTOLIC BLOOD PRESSURE: 110 MMHG | DIASTOLIC BLOOD PRESSURE: 70 MMHG | TEMPERATURE: 98.2 F | HEIGHT: 63 IN | WEIGHT: 190 LBS | BODY MASS INDEX: 33.66 KG/M2 | OXYGEN SATURATION: 98 %

## 2024-01-17 DIAGNOSIS — R09.89 CHEST CONGESTION: Primary | ICD-10-CM

## 2024-01-17 DIAGNOSIS — H66.003 NON-RECURRENT ACUTE SUPPURATIVE OTITIS MEDIA OF BOTH EARS WITHOUT SPONTANEOUS RUPTURE OF TYMPANIC MEMBRANES: ICD-10-CM

## 2024-01-17 DIAGNOSIS — J02.9 SORE THROAT: ICD-10-CM

## 2024-01-17 LAB
INFLUENZA A ANTIGEN, POC: NEGATIVE
INFLUENZA B ANTIGEN, POC: NEGATIVE
LOT EXPIRE DATE: NORMAL
LOT KIT NUMBER: NORMAL
S PYO AG THROAT QL: NORMAL
SARS-COV-2, POC: NORMAL
VALID INTERNAL CONTROL: NORMAL
VENDOR AND KIT NAME POC: NORMAL

## 2024-01-17 PROCEDURE — 99214 OFFICE O/P EST MOD 30 MIN: CPT

## 2024-01-17 PROCEDURE — 87880 STREP A ASSAY W/OPTIC: CPT

## 2024-01-17 PROCEDURE — 1036F TOBACCO NON-USER: CPT

## 2024-01-17 PROCEDURE — G8417 CALC BMI ABV UP PARAM F/U: HCPCS

## 2024-01-17 PROCEDURE — G8484 FLU IMMUNIZE NO ADMIN: HCPCS

## 2024-01-17 PROCEDURE — 87428 SARSCOV & INF VIR A&B AG IA: CPT

## 2024-01-17 PROCEDURE — G8427 DOCREV CUR MEDS BY ELIG CLIN: HCPCS

## 2024-01-17 RX ORDER — AMOXICILLIN 500 MG/1
500 CAPSULE ORAL 2 TIMES DAILY
Qty: 20 CAPSULE | Refills: 0 | Status: SHIPPED | OUTPATIENT
Start: 2024-01-17 | End: 2024-01-27

## 2024-01-17 RX ORDER — PSEUDOEPHEDRINE HCL 30 MG
30 TABLET ORAL EVERY 6 HOURS PRN
Qty: 30 TABLET | Refills: 0 | Status: SHIPPED | OUTPATIENT
Start: 2024-01-17 | End: 2025-01-16

## 2024-01-17 NOTE — PROGRESS NOTES
[facility]  [address] [address]  Dept: [phone]    Date of Service:  1/17/2024    Madhavi Hein is a 23 y.o. female who presents in office today with Self.    Chief Complaint   Patient presents with    Pharyngitis     Patient is here today for a sore throat that started Friday with congestion starting yesterday.         Diagnoses / Plan:   1. Chest congestion  -     POCT COVID-19 & Influenza A/B  -     POCT rapid strep A  -     pseudoephedrine (DECONGESTANT) 30 MG tablet; Take 1 tablet by mouth every 6 hours as needed for Congestion, Disp-30 tablet, R-0Normal  2. Non-recurrent acute suppurative otitis media of both ears without spontaneous rupture of tympanic membranes  -     amoxicillin (AMOXIL) 500 MG capsule; Take 1 capsule by mouth 2 times daily for 10 days, Disp-20 capsule, R-0Normal  -     pseudoephedrine (DECONGESTANT) 30 MG tablet; Take 1 tablet by mouth every 6 hours as needed for Congestion, Disp-30 tablet, R-0Normal  3. Sore throat  -     POCT COVID-19 & Influenza A/B  -     POCT rapid strep A  -     pseudoephedrine (DECONGESTANT) 30 MG tablet; Take 1 tablet by mouth every 6 hours as needed for Congestion, Disp-30 tablet, R-0Normal   -Negative influenza and COVID screening, negative rapid screening however tonsils are quite erythematous without exudate.  TMs bilaterally are bulging with erythremia.  Will treat for otitis media and pharyngitis with Amoxil.  Encouraged hydration, use decongestion.  She does not have 1 at home so I will send in pseudoephedrine she has no blood pressure issues or contraindications for this.    Amoxil, Sudafed medication sent to the pharmacy.  Discussed medication desired effects, potential side effects, and how to take the medication.  Encouraged symptomatic treatment, rest, increase oral fluid intake.  Follow-up for worsening or persistent symptoms.  Patient verbalizes understanding regarding plan of care and all questions

## 2024-01-22 ASSESSMENT — ENCOUNTER SYMPTOMS
COUGH: 1
SINUS PRESSURE: 1
DIARRHEA: 0
WHEEZING: 0
SINUS PAIN: 0
ABDOMINAL PAIN: 0
CHEST TIGHTNESS: 0
RHINORRHEA: 0
TROUBLE SWALLOWING: 0
SORE THROAT: 1
EYE DISCHARGE: 0
COLOR CHANGE: 0
EYE ITCHING: 0
BACK PAIN: 0
SHORTNESS OF BREATH: 0
CONSTIPATION: 0
NAUSEA: 0

## 2024-02-06 ENCOUNTER — OFFICE VISIT (OUTPATIENT)
Dept: FAMILY MEDICINE CLINIC | Age: 24
End: 2024-02-06
Payer: COMMERCIAL

## 2024-02-06 ENCOUNTER — HOSPITAL ENCOUNTER (OUTPATIENT)
Age: 24
Setting detail: SPECIMEN
Discharge: HOME | End: 2024-02-06
Payer: COMMERCIAL

## 2024-02-06 VITALS
DIASTOLIC BLOOD PRESSURE: 70 MMHG | BODY MASS INDEX: 33.04 KG/M2 | HEART RATE: 67 BPM | WEIGHT: 188 LBS | OXYGEN SATURATION: 99 % | SYSTOLIC BLOOD PRESSURE: 118 MMHG

## 2024-02-06 DIAGNOSIS — R30.0 BURNING WITH URINATION: ICD-10-CM

## 2024-02-06 DIAGNOSIS — R30.0 BURNING WITH URINATION: Primary | ICD-10-CM

## 2024-02-06 DIAGNOSIS — N30.00 ACUTE CYSTITIS WITHOUT HEMATURIA: ICD-10-CM

## 2024-02-06 LAB
BILIRUBIN, POC: NEGATIVE
BLOOD URINE, POC: NORMAL
CLARITY, POC: NORMAL
COLOR, POC: YELLOW
GLUCOSE URINE, POC: NEGATIVE
KETONES, POC: NEGATIVE
LEUKOCYTE EST, POC: NORMAL
NITRITE, POC: NEGATIVE
PH, POC: 6.5
PROTEIN, POC: NEGATIVE
SPECIFIC GRAVITY, POC: 1.01
UROBILINOGEN, POC: 0.2

## 2024-02-06 PROCEDURE — 87086 URINE CULTURE/COLONY COUNT: CPT

## 2024-02-06 PROCEDURE — 81002 URINALYSIS NONAUTO W/O SCOPE: CPT

## 2024-02-06 PROCEDURE — 87186 SC STD MICRODIL/AGAR DIL: CPT

## 2024-02-06 PROCEDURE — 87088 URINE BACTERIA CULTURE: CPT

## 2024-02-06 PROCEDURE — G8417 CALC BMI ABV UP PARAM F/U: HCPCS

## 2024-02-06 PROCEDURE — 1036F TOBACCO NON-USER: CPT

## 2024-02-06 PROCEDURE — G8427 DOCREV CUR MEDS BY ELIG CLIN: HCPCS

## 2024-02-06 PROCEDURE — G8484 FLU IMMUNIZE NO ADMIN: HCPCS

## 2024-02-06 PROCEDURE — 99214 OFFICE O/P EST MOD 30 MIN: CPT

## 2024-02-06 RX ORDER — PHENAZOPYRIDINE HYDROCHLORIDE 100 MG/1
100 TABLET, FILM COATED ORAL 3 TIMES DAILY PRN
Qty: 9 TABLET | Refills: 0 | Status: SHIPPED | OUTPATIENT
Start: 2024-02-06 | End: 2024-02-09

## 2024-02-06 RX ORDER — NITROFURANTOIN 25; 75 MG/1; MG/1
100 CAPSULE ORAL 2 TIMES DAILY
Qty: 20 CAPSULE | Refills: 0 | Status: SHIPPED | OUTPATIENT
Start: 2024-02-06 | End: 2024-02-16

## 2024-02-06 ASSESSMENT — PATIENT HEALTH QUESTIONNAIRE - PHQ9
SUM OF ALL RESPONSES TO PHQ QUESTIONS 1-9: 0
SUM OF ALL RESPONSES TO PHQ9 QUESTIONS 1 & 2: 0
2. FEELING DOWN, DEPRESSED OR HOPELESS: 0
1. LITTLE INTEREST OR PLEASURE IN DOING THINGS: 0

## 2024-02-06 NOTE — PROGRESS NOTES
[facility]  [address] [address]  Dept: [phone]    Date of Service:  2/6/2024    Madhavi Hein is a 23 y.o. female who presents in office today with Self.    Chief Complaint   Patient presents with    Urinary Tract Infection     Patient is here today for burning with urination and frequency that started today.         Diagnoses / Plan:   1. Burning with urination  -     POCT Urinalysis no Micro  -     Culture, Urine; Future  -     phenazopyridine (PYRIDIUM) 100 MG tablet; Take 1 tablet by mouth 3 times daily as needed for Pain, Disp-9 tablet, R-0Normal  2. Acute cystitis without hematuria  -     nitrofurantoin, macrocrystal-monohydrate, (MACROBID) 100 MG capsule; Take 1 capsule by mouth 2 times daily for 10 days, Disp-20 capsule, R-0Normal  -     phenazopyridine (PYRIDIUM) 100 MG tablet; Take 1 tablet by mouth 3 times daily as needed for Pain, Disp-9 tablet, R-0Normal  Start on Macrobid twice daily.  Encourage hydration with eight 8 ounce glasses of water, start Pyridium.  Does not wear contacts however warned about staining and change of urine color.  Do not take Pyridium more than 3 days.  Will send for culture, office will call with results of culture.    Pyridium and Macrobid medication sent to the pharmacy.  Discussed medication desired effects, potential side effects, and how to take the medication.  Encouraged symptomatic treatment, rest, increase oral fluid intake.  Follow-up for worsening or persistent symptoms.  Patient verbalizes understanding regarding plan of care and all questions answered.    Return if symptoms worsen or fail to improve.     Subjective:   History of Present Illness:  -Complain of dysuria and increased urgency.  Denies fever, chills, flank pain.  Denies any gross hematuria.  Symptom onset was approximately 12 to 24 hours prior.  No vaginal complaints such as vaginal discharge, malodor.      Current Outpatient Medications   Medication  Pt c/o vaginal bleeding that started at 0000 like a menstrual period and then stopped.  Pt approx 12 wks pregnant.  EDC 24.      Pt also having lower abd cramping.  .  Pt denies any urinary sx, vaginal dc, cough, fever, n/v/d.  OB-Rocio Epps.  Recent normal US.

## 2024-02-08 LAB
MICROORGANISM SPEC CULT: ABNORMAL
SPECIMEN DESCRIPTION: ABNORMAL

## 2024-02-08 ASSESSMENT — ENCOUNTER SYMPTOMS
COUGH: 0
SHORTNESS OF BREATH: 0

## 2024-06-13 ENCOUNTER — OFFICE VISIT (OUTPATIENT)
Dept: FAMILY MEDICINE CLINIC | Age: 24
End: 2024-06-13
Payer: COMMERCIAL

## 2024-06-13 VITALS
WEIGHT: 172 LBS | OXYGEN SATURATION: 96 % | HEART RATE: 84 BPM | TEMPERATURE: 97.8 F | SYSTOLIC BLOOD PRESSURE: 128 MMHG | BODY MASS INDEX: 29.37 KG/M2 | DIASTOLIC BLOOD PRESSURE: 88 MMHG | RESPIRATION RATE: 18 BRPM | HEIGHT: 64 IN

## 2024-06-13 DIAGNOSIS — N92.6 MISSED PERIOD: ICD-10-CM

## 2024-06-13 DIAGNOSIS — A08.4 VIRAL GASTROENTERITIS: Primary | ICD-10-CM

## 2024-06-13 DIAGNOSIS — N94.6 DYSMENORRHEA: ICD-10-CM

## 2024-06-13 DIAGNOSIS — R11.2 NAUSEA AND VOMITING, UNSPECIFIED VOMITING TYPE: ICD-10-CM

## 2024-06-13 PROCEDURE — 1036F TOBACCO NON-USER: CPT | Performed by: NURSE PRACTITIONER

## 2024-06-13 PROCEDURE — 81025 URINE PREGNANCY TEST: CPT | Performed by: NURSE PRACTITIONER

## 2024-06-13 PROCEDURE — G8427 DOCREV CUR MEDS BY ELIG CLIN: HCPCS | Performed by: NURSE PRACTITIONER

## 2024-06-13 PROCEDURE — G8417 CALC BMI ABV UP PARAM F/U: HCPCS | Performed by: NURSE PRACTITIONER

## 2024-06-13 PROCEDURE — 99213 OFFICE O/P EST LOW 20 MIN: CPT | Performed by: NURSE PRACTITIONER

## 2024-06-13 RX ORDER — ONDANSETRON 4 MG/1
4 TABLET, FILM COATED ORAL 3 TIMES DAILY PRN
Qty: 15 TABLET | Refills: 0 | Status: SHIPPED | OUTPATIENT
Start: 2024-06-13

## 2024-06-13 ASSESSMENT — ENCOUNTER SYMPTOMS
NAUSEA: 0
RHINORRHEA: 1
VOMITING: 1
BACK PAIN: 0
COUGH: 0
ABDOMINAL PAIN: 0
DIARRHEA: 0

## 2024-06-13 NOTE — PROGRESS NOTES
[facility] department of [address] [address]  Phone: [phone]  Fax: [phone]      Madhavi Hein is a 23 y.o. female who presents to the [facility] today for her medical conditions/complaints as noted below.    Madhavi Hein is c/o of Emesis (Vomiting yesterday and last time at 10 this morning. Left ear pain. Stated had fever yesterday morning. Doesn't remember having period in may and is regular)      Assessment and Plan     1. Viral gastroenteritis  Discussed viral illness with patient and she was encouraged to drink small but frequent amounts of water and slowly advance diet as tolerated. No spicy, acidic or citrus foods and drinks. They were also encouraged to drink an electrolyte replacement such as Gatorade, G2, Propel or Pedialyte to help avoid dehydration.        2. Nausea and vomiting, unspecified vomiting type  Discussed use of Zofran  - POCT HCG, Prenancy, Ur  - ondansetron (ZOFRAN) 4 MG tablet; Take 1 tablet by mouth 3 times daily as needed for Nausea or Vomiting  Dispense: 15 tablet; Refill: 0    3. Missed period  Negative pregnancy test.  - POCT HCG, Prenancy, Ur    4. Dysmenorrhea        Subjective:   Patient presents to the walk in for complaints of nausea and vomiting that started yesterday. Has had about 6 episodes of vomiting between yesterday and today with the last one occurring around 1030 this morning. Mostly stomach contents and no blood. No diarrhea or abdominal pain. Fever up to 101 yesterday but took an OTC cold medicine and fever broke and has not returned. No ill contacts or suspicious food intake. Able to keep water down today.      Emesis   This is a new problem. The current episode started yesterday. The problem occurs 5 to 10 times per day. The problem has been gradually improving (last vomiting 1030 this morning.). The emesis has an appearance of stomach contents. The maximum

## 2024-06-13 NOTE — PATIENT INSTRUCTIONS
You are encouraged to drink small but frequent amounts of water and slowly advance diet as tolerated. No spicy, acidic or citrus foods and drinks. They were also encouraged to drink an electrolyte replacement such as Gatorade, G2, Propel or Pedialyte to help avoid dehydration.

## 2024-07-29 ENCOUNTER — OFFICE VISIT (OUTPATIENT)
Dept: FAMILY MEDICINE CLINIC | Age: 24
End: 2024-07-29
Payer: COMMERCIAL

## 2024-07-29 VITALS
OXYGEN SATURATION: 98 % | DIASTOLIC BLOOD PRESSURE: 70 MMHG | SYSTOLIC BLOOD PRESSURE: 100 MMHG | HEART RATE: 71 BPM | RESPIRATION RATE: 20 BRPM | TEMPERATURE: 98 F | BODY MASS INDEX: 30.05 KG/M2 | HEIGHT: 64 IN | WEIGHT: 176 LBS

## 2024-07-29 DIAGNOSIS — M54.50 ACUTE MIDLINE LOW BACK PAIN WITHOUT SCIATICA: Primary | ICD-10-CM

## 2024-07-29 PROCEDURE — G8417 CALC BMI ABV UP PARAM F/U: HCPCS | Performed by: NURSE PRACTITIONER

## 2024-07-29 PROCEDURE — 99213 OFFICE O/P EST LOW 20 MIN: CPT | Performed by: NURSE PRACTITIONER

## 2024-07-29 PROCEDURE — G8427 DOCREV CUR MEDS BY ELIG CLIN: HCPCS | Performed by: NURSE PRACTITIONER

## 2024-07-29 PROCEDURE — 1036F TOBACCO NON-USER: CPT | Performed by: NURSE PRACTITIONER

## 2024-07-29 RX ORDER — NAPROXEN 375 MG/1
375 TABLET ORAL 2 TIMES DAILY WITH MEALS
Qty: 60 TABLET | Refills: 0 | Status: SHIPPED | OUTPATIENT
Start: 2024-07-29

## 2024-07-29 RX ORDER — NORELGESTROMIN AND ETHINYL ESTRADIOL 35; 150 UG/MG; UG/MG
1 PATCH TRANSDERMAL WEEKLY
COMMUNITY
Start: 2024-07-05 | End: 2024-10-03

## 2024-07-29 ASSESSMENT — ENCOUNTER SYMPTOMS: BACK PAIN: 1

## 2024-07-29 NOTE — PROGRESS NOTES
[facility] department [address] [address]  Phone: [phone]  Fax: [phone]    Madhavi Hein (:  2000) is a 23 y.o. female,Established patient, here for evaluation of the following chief complaint(s):  Nausea (Woke up feeling nauseated, dizzy, headaches, lower back pain in spinal area. )      Assessment and Plan     1. Acute midline low back pain without sciatica  Likely sore from amusement park rides. Discussed use of Naproxen for pain and she was given stretching exercises to try at home. Follow up if pain persists and discussed next recommendation would be for physical therapy.   - naproxen (NAPROSYN) 375 MG tablet; Take 1 tablet by mouth 2 times daily (with meals)  Dispense: 60 tablet; Refill: 0      Return if symptoms worsen or fail to improve.      Discussed exam, POCT findings, plan of care, and follow-up at length with patient and/or their caregiver. Reviewed all prescribed and recommended medications, administration and side effects. All questions were addressed and answered with verbalization of understanding. The patient and/or the caregiver was agreeable with the plan.   Subjective:   Patient presents to the walk in for complaints of lower back pain and headache. Had dizziness and nausea worse yesterday. Was at [employer] all day on Saturday and is also sun burnt some.       Back Pain  This is a new problem. The current episode started in the past 7 days. The problem occurs constantly. The problem has been gradually worsening since onset. The pain is present in the lumbar spine. The quality of the pain is described as shooting and stabbing. The pain does not radiate. The pain is moderate. The pain is The same all the time. The symptoms are aggravated by standing. Associated symptoms include headaches. Pertinent negatives include no bladder incontinence, dysuria, numbness, paresthesias, pelvic pain, perianal

## 2024-07-30 ENCOUNTER — TELEPHONE (OUTPATIENT)
Dept: FAMILY MEDICINE CLINIC | Age: 24
End: 2024-07-30

## 2024-07-30 NOTE — TELEPHONE ENCOUNTER
Pt calling to see id the work note she got yesterday from you could be extended to tomorrow. She stated she doesn't feel she is ready to go back today.

## 2024-08-27 ENCOUNTER — OFFICE VISIT (OUTPATIENT)
Dept: FAMILY MEDICINE CLINIC | Age: 24
End: 2024-08-27
Payer: COMMERCIAL

## 2024-08-27 VITALS
OXYGEN SATURATION: 98 % | SYSTOLIC BLOOD PRESSURE: 115 MMHG | BODY MASS INDEX: 31.36 KG/M2 | DIASTOLIC BLOOD PRESSURE: 80 MMHG | HEIGHT: 63 IN | WEIGHT: 177 LBS | HEART RATE: 75 BPM

## 2024-08-27 DIAGNOSIS — N30.00 ACUTE CYSTITIS WITHOUT HEMATURIA: Primary | ICD-10-CM

## 2024-08-27 PROCEDURE — 99213 OFFICE O/P EST LOW 20 MIN: CPT

## 2024-08-27 PROCEDURE — 1036F TOBACCO NON-USER: CPT

## 2024-08-27 PROCEDURE — G8427 DOCREV CUR MEDS BY ELIG CLIN: HCPCS

## 2024-08-27 PROCEDURE — G8417 CALC BMI ABV UP PARAM F/U: HCPCS

## 2024-08-27 RX ORDER — NITROFURANTOIN 25; 75 MG/1; MG/1
100 CAPSULE ORAL 2 TIMES DAILY
Qty: 20 CAPSULE | Refills: 0 | Status: SHIPPED | OUTPATIENT
Start: 2024-08-27 | End: 2024-09-06

## 2024-08-27 RX ORDER — NITROFURANTOIN 25; 75 MG/1; MG/1
100 CAPSULE ORAL 2 TIMES DAILY
Qty: 20 CAPSULE | Refills: 0 | Status: SHIPPED | OUTPATIENT
Start: 2024-08-27 | End: 2024-08-27

## 2024-08-27 RX ORDER — CEPHALEXIN 500 MG/1
500 CAPSULE ORAL 2 TIMES DAILY
COMMUNITY
Start: 2024-08-23 | End: 2024-08-27

## 2024-08-27 NOTE — PROGRESS NOTES
[facility]  [address] [place] [address]  Dept: [phone]    Date of Service:  8/27/2024    Madhavi Hein is a 23 y.o. female who presents in office today with Self.    Chief Complaint   Patient presents with    Urinary Tract Infection     Pt states she went to the Er Friday for a UTI. Pt states she is on antibiotics for it but states that she is still burning when she urinates.         Diagnoses / Plan:   1. Acute cystitis without hematuria  -     nitrofurantoin, macrocrystal-monohydrate, (MACROBID) 100 MG capsule; Take 1 capsule by mouth 2 times daily for 10 days, Disp-20 capsule, R-0Normal   -Was in the ER for this urine culture unavailable POCT urine does look like urinary tract infection.  Keflex and Pyridium.  Pyridium did seem to help a little bit however return of symptoms when stopping Pyridium.  Will change antibiotic to Macrobid as she is already had 3 days of Keflex and this should have been more efficacious.  If still having symptoms advised to come back for another urinalysis with culture.    Macrobid medication sent to the pharmacy.  Discussed medication desired effects, potential side effects, and how to take the medication.  Encouraged symptomatic treatment, rest, increase oral fluid intake.  Follow-up for worsening or persistent symptoms.  Patient verbalizes understanding regarding plan of care and all questions answered.    Return if symptoms worsen or fail to improve.     Subjective:   History of Present Illness:  Madhavi is here today for cystitis.  She was in the emergency department over the weekend for this and was placed on antibiotics and Pyridium.  She states it started to help when she was taking Pyridium but when she stopped she started having return of symptoms.  Unable to tell if she has blood in her urine because her urine is orange at the moment from Pyridium but there was no blood prior to starting antibiotics.  Denies flank pain, denies

## 2024-09-03 ASSESSMENT — ENCOUNTER SYMPTOMS
NAUSEA: 0
ABDOMINAL PAIN: 0
COLOR CHANGE: 0
CHOKING: 0
VOMITING: 0
DIARRHEA: 0
COUGH: 0

## 2024-11-14 ENCOUNTER — OFFICE VISIT (OUTPATIENT)
Dept: FAMILY MEDICINE CLINIC | Age: 24
End: 2024-11-14
Payer: COMMERCIAL

## 2024-11-14 VITALS
HEART RATE: 69 BPM | BODY MASS INDEX: 31.36 KG/M2 | HEIGHT: 63 IN | DIASTOLIC BLOOD PRESSURE: 80 MMHG | SYSTOLIC BLOOD PRESSURE: 100 MMHG | WEIGHT: 177 LBS | TEMPERATURE: 99.1 F | OXYGEN SATURATION: 100 %

## 2024-11-14 DIAGNOSIS — J06.9 VIRAL URI: Primary | ICD-10-CM

## 2024-11-14 PROCEDURE — G8484 FLU IMMUNIZE NO ADMIN: HCPCS

## 2024-11-14 PROCEDURE — 1036F TOBACCO NON-USER: CPT

## 2024-11-14 PROCEDURE — G8427 DOCREV CUR MEDS BY ELIG CLIN: HCPCS

## 2024-11-14 PROCEDURE — G8417 CALC BMI ABV UP PARAM F/U: HCPCS

## 2024-11-14 PROCEDURE — 99212 OFFICE O/P EST SF 10 MIN: CPT

## 2024-11-14 NOTE — PROGRESS NOTES
[facility]  [address] [address]  Dept: [phone]    Date of Service:  11/14/2024    Madhavi Hein is a 23 y.o. female who presents in office today with Self.    Chief Complaint   Patient presents with    Nasal Congestion     Pt is here for runny nose,cough, sore throat. Pt states the symptoms started yesterday.     Cough    Pharyngitis        Diagnoses / Plan:   1. Viral URI  Viral URI as discussed, conservative treatments and over-the-counter decongestions.  Let office know if any worsening symptoms or no improvement.  Will give work note     Encouraged symptomatic treatment, rest, increase oral fluid intake.  Follow-up for worsening or persistent symptoms.  Patient verbalizes understanding regarding plan of care and all questions answered.    No follow-ups on file.     Subjective:   History of Present Illness:  Madhavi is here today for nasal congestion, cough and sore throat.  This started approximately a day or so ago.  Positive exposure to illness.  Nasal drainage is mostly clear yellow, cough is nonproductive, sore throat but able to drink fluids without any difficulties.  No reported fever, reports chills and fatigue.  No myalgia, no nausea vomiting diarrhea.      Current Outpatient Medications   Medication Sig Dispense Refill    norelgestromin-ethinyl estradiol (XULANE) 150-35 MCG/24HR Place 1 patch onto the skin once a week       No current facility-administered medications for this visit.       Review of Systems   Constitutional:  Positive for chills and fatigue. Negative for fever.   HENT:  Positive for congestion, postnasal drip and sore throat.    Respiratory:  Positive for cough. Negative for shortness of breath and wheezing.    Cardiovascular:  Negative for chest pain.   Gastrointestinal:  Negative for abdominal pain, diarrhea, nausea and vomiting.              2/6/2024     2:53 PM 10/24/2023     1:42 PM 9/25/2023     1:40 PM 7/25/2022    12:12 PM

## 2024-11-20 ASSESSMENT — ENCOUNTER SYMPTOMS
WHEEZING: 0
SORE THROAT: 1
NAUSEA: 0
SHORTNESS OF BREATH: 0
DIARRHEA: 0
ABDOMINAL PAIN: 0
VOMITING: 0
COUGH: 1

## 2024-12-27 ENCOUNTER — OFFICE VISIT (OUTPATIENT)
Dept: FAMILY MEDICINE CLINIC | Age: 24
End: 2024-12-27
Payer: COMMERCIAL

## 2024-12-27 VITALS
BODY MASS INDEX: 31.54 KG/M2 | TEMPERATURE: 99.3 F | SYSTOLIC BLOOD PRESSURE: 116 MMHG | OXYGEN SATURATION: 98 % | HEIGHT: 63 IN | DIASTOLIC BLOOD PRESSURE: 82 MMHG | HEART RATE: 74 BPM | WEIGHT: 178 LBS

## 2024-12-27 DIAGNOSIS — J02.9 SORE THROAT: ICD-10-CM

## 2024-12-27 DIAGNOSIS — R05.9 COUGH, UNSPECIFIED TYPE: ICD-10-CM

## 2024-12-27 DIAGNOSIS — J06.9 VIRAL URI: ICD-10-CM

## 2024-12-27 LAB
INFLUENZA A ANTIGEN, POC: NEGATIVE
INFLUENZA B ANTIGEN, POC: NEGATIVE
LOT EXPIRE DATE: NORMAL
LOT KIT NUMBER: NORMAL
SARS-COV-2, POC: NORMAL
VALID INTERNAL CONTROL: NORMAL
VENDOR AND KIT NAME POC: NORMAL

## 2024-12-27 PROCEDURE — 87428 SARSCOV & INF VIR A&B AG IA: CPT | Performed by: FAMILY MEDICINE

## 2024-12-27 PROCEDURE — G8417 CALC BMI ABV UP PARAM F/U: HCPCS | Performed by: FAMILY MEDICINE

## 2024-12-27 PROCEDURE — G8427 DOCREV CUR MEDS BY ELIG CLIN: HCPCS | Performed by: FAMILY MEDICINE

## 2024-12-27 PROCEDURE — 1036F TOBACCO NON-USER: CPT | Performed by: FAMILY MEDICINE

## 2024-12-27 PROCEDURE — 99213 OFFICE O/P EST LOW 20 MIN: CPT | Performed by: FAMILY MEDICINE

## 2024-12-27 PROCEDURE — G8484 FLU IMMUNIZE NO ADMIN: HCPCS | Performed by: FAMILY MEDICINE

## 2024-12-27 RX ORDER — NORETHINDRONE ACETATE AND ETHINYL ESTRADIOL 1.5; 3 MG/1; UG/1
1 TABLET ORAL DAILY
COMMUNITY
Start: 2024-10-07

## 2024-12-27 RX ORDER — AZITHROMYCIN 250 MG/1
TABLET, FILM COATED ORAL
Qty: 6 TABLET | Refills: 0 | Status: SHIPPED | OUTPATIENT
Start: 2024-12-27 | End: 2024-12-27 | Stop reason: CLARIF

## 2024-12-27 NOTE — PROGRESS NOTES
HPI:  Patient comes in today for   Chief Complaint   Patient presents with    Nasal Congestion     Pt is here for runny nose,sore throat, ear pain,cough. Pt states that the symptoms started yesterday.     Pharyngitis    Ear Pain   Here with c/o sorethroat ,runny nose cough ear ache and post nasal drainage,No nausea or emesis.Has a low grade fever here     REVIEW OF SYSTEMS:  Cardio: No chest pain, palpitations, ALBA, edema, PND  Pulmonary: Has a cough,no hemoptysis, no SOB  GI: No nausea, vomiting, dysphagia, odynophagia, diarrhea,constipation  : No dysuria, hematuria, urgency, incontinence  Past Medical History:   Diagnosis Date    Abdominal pain     Allergy     Allergies    Diarrhea     Dysphagia     GERD (gastroesophageal reflux disease)     Headache(784.0)     Reflux     Vomiting        Current Outpatient Medications   Medication Sig Dispense Refill    JUNEL 1.5/30 1.5-30 MG-MCG TABS Take 1 tablet by mouth daily      norelgestromin-ethinyl estradiol (XULANE) 150-35 MCG/24HR Place 1 patch onto the skin once a week (Patient not taking: Reported on 12/27/2024)       No current facility-administered medications for this visit.     Allergies   Allergen Reactions    Grass Pollen(K-O-R-T-Swt Madi) Itching     Rosebud Trees       PHYSICAL EXAM:  VS:  /82 (Site: Right Upper Arm, Position: Sitting, Cuff Size: Large Adult)   Pulse 74   Temp 99.3 °F (37.4 °C)   Ht 1.6 m (5' 3\")   Wt 80.7 kg (178 lb)   SpO2 98%   BMI 31.53 kg/m²   General:  Alert and oriented, NAD  HEENT:  Ear canals dry,TMs om, LAVELL, EOMI, Conjunctivae clear,Throat currently clear.Nose congested  NECK:  Supple without adenopathy or thyromegaly, no carotid bruits  LUNGS:  CTA all fields  HEART:  RRR without Murmur  ABDOMEN:  Soft and nontender without palpable abnormalities  EXTREMITIES:  No edema, no calf tenderness    ASSESSMENT/PLAN:     Diagnosis Orders   1. Viral URI        2. Cough, unspecified type  POCT COVID-19 & Influenza A/B      3. Sore

## 2025-02-03 ENCOUNTER — OFFICE VISIT (OUTPATIENT)
Dept: FAMILY MEDICINE CLINIC | Age: 25
End: 2025-02-03
Payer: COMMERCIAL

## 2025-02-03 VITALS
DIASTOLIC BLOOD PRESSURE: 78 MMHG | SYSTOLIC BLOOD PRESSURE: 102 MMHG | HEART RATE: 107 BPM | BODY MASS INDEX: 31.89 KG/M2 | TEMPERATURE: 98.5 F | OXYGEN SATURATION: 96 % | WEIGHT: 180 LBS

## 2025-02-03 DIAGNOSIS — R11.2 NAUSEA AND VOMITING, UNSPECIFIED VOMITING TYPE: Primary | ICD-10-CM

## 2025-02-03 PROCEDURE — G8427 DOCREV CUR MEDS BY ELIG CLIN: HCPCS

## 2025-02-03 PROCEDURE — G8417 CALC BMI ABV UP PARAM F/U: HCPCS

## 2025-02-03 PROCEDURE — 99213 OFFICE O/P EST LOW 20 MIN: CPT

## 2025-02-03 PROCEDURE — 87428 SARSCOV & INF VIR A&B AG IA: CPT

## 2025-02-03 PROCEDURE — 1036F TOBACCO NON-USER: CPT

## 2025-02-03 RX ORDER — ONDANSETRON 4 MG/1
4 TABLET, ORALLY DISINTEGRATING ORAL 3 TIMES DAILY PRN
Qty: 21 TABLET | Refills: 0 | Status: SHIPPED | OUTPATIENT
Start: 2025-02-03

## 2025-02-03 SDOH — ECONOMIC STABILITY: FOOD INSECURITY: WITHIN THE PAST 12 MONTHS, THE FOOD YOU BOUGHT JUST DIDN'T LAST AND YOU DIDN'T HAVE MONEY TO GET MORE.: NEVER TRUE

## 2025-02-03 SDOH — ECONOMIC STABILITY: FOOD INSECURITY: WITHIN THE PAST 12 MONTHS, YOU WORRIED THAT YOUR FOOD WOULD RUN OUT BEFORE YOU GOT MONEY TO BUY MORE.: NEVER TRUE

## 2025-02-03 ASSESSMENT — PATIENT HEALTH QUESTIONNAIRE - PHQ9
SUM OF ALL RESPONSES TO PHQ9 QUESTIONS 1 & 2: 0
SUM OF ALL RESPONSES TO PHQ QUESTIONS 1-9: 0
2. FEELING DOWN, DEPRESSED OR HOPELESS: NOT AT ALL
SUM OF ALL RESPONSES TO PHQ QUESTIONS 1-9: 0
SUM OF ALL RESPONSES TO PHQ QUESTIONS 1-9: 0
1. LITTLE INTEREST OR PLEASURE IN DOING THINGS: NOT AT ALL
SUM OF ALL RESPONSES TO PHQ QUESTIONS 1-9: 0

## 2025-02-03 NOTE — PROGRESS NOTES
[facility]  [address] [address]  Dept: [phone]    Date of Service:  2/3/2025    Madhavi Hein is a 24 y.o. female who presents in office today with Self and Significant Other.    Chief Complaint   Patient presents with    Nausea & Vomiting     Patient is here today for vomiting and diarrhea that started last night     Diarrhea        Diagnoses / Plan:   1. Nausea and vomiting, unspecified vomiting type  -     POCT COVID-19 & Influenza A/B  -     ondansetron (ZOFRAN-ODT) 4 MG disintegrating tablet; Take 1 tablet by mouth 3 times daily as needed for Nausea or Vomiting, Disp-21 tablet, R-0Normal  Viral gastroenteritis is discussed, will give note for work as well as ODT Zofran.  Side effects of medication are discussed.  Encouraged oral hydration letting office know if any worsening or changing symptoms    Zofran medication sent to the pharmacy.  Discussed medication desired effects, potential side effects, and how to take the medication.  Encouraged symptomatic treatment, rest, increase oral fluid intake.  Follow-up for worsening or persistent symptoms.  Patient verbalizes understanding regarding plan of care and all questions answered.    No follow-ups on file.     Subjective:   History of Present Illness:  Madhavi is here today for nausea and vomiting x 1, seems to be improving today but is having some diarrhea.  No congestion, no cough, no fever or chills.  Positive sick exposure.  She was unable to go to work today.  Denies any blood with stool, stool is mostly liquid, 3-4 times yesterday and once today.      Current Outpatient Medications   Medication Sig Dispense Refill    ondansetron (ZOFRAN-ODT) 4 MG disintegrating tablet Take 1 tablet by mouth 3 times daily as needed for Nausea or Vomiting 21 tablet 0    JUNEL 1.5/30 1.5-30 MG-MCG TABS Take 1 tablet by mouth daily       No current facility-administered medications for this visit.       Review of Systems

## 2025-02-06 ASSESSMENT — ENCOUNTER SYMPTOMS
ABDOMINAL PAIN: 1
DIARRHEA: 1
COLOR CHANGE: 0
COUGH: 0
NAUSEA: 1

## 2025-03-06 ENCOUNTER — OFFICE VISIT (OUTPATIENT)
Dept: FAMILY MEDICINE CLINIC | Age: 25
End: 2025-03-06
Payer: COMMERCIAL

## 2025-03-06 VITALS
WEIGHT: 178 LBS | HEART RATE: 85 BPM | BODY MASS INDEX: 31.54 KG/M2 | OXYGEN SATURATION: 98 % | HEIGHT: 63 IN | DIASTOLIC BLOOD PRESSURE: 72 MMHG | TEMPERATURE: 98.8 F | SYSTOLIC BLOOD PRESSURE: 116 MMHG

## 2025-03-06 DIAGNOSIS — R05.9 COUGH, UNSPECIFIED TYPE: ICD-10-CM

## 2025-03-06 DIAGNOSIS — J02.9 SORE THROAT: Primary | ICD-10-CM

## 2025-03-06 DIAGNOSIS — H66.001 NON-RECURRENT ACUTE SUPPURATIVE OTITIS MEDIA OF RIGHT EAR WITHOUT SPONTANEOUS RUPTURE OF TYMPANIC MEMBRANE: ICD-10-CM

## 2025-03-06 PROCEDURE — 1036F TOBACCO NON-USER: CPT

## 2025-03-06 PROCEDURE — 99213 OFFICE O/P EST LOW 20 MIN: CPT

## 2025-03-06 PROCEDURE — G8417 CALC BMI ABV UP PARAM F/U: HCPCS

## 2025-03-06 PROCEDURE — 87428 SARSCOV & INF VIR A&B AG IA: CPT

## 2025-03-06 PROCEDURE — G8427 DOCREV CUR MEDS BY ELIG CLIN: HCPCS

## 2025-03-06 RX ORDER — AMOXICILLIN 500 MG/1
500 CAPSULE ORAL 2 TIMES DAILY
Qty: 20 CAPSULE | Refills: 0 | Status: SHIPPED | OUTPATIENT
Start: 2025-03-06 | End: 2025-03-16

## 2025-03-06 NOTE — PROGRESS NOTES
[facility]  [address] [address]  Dept: [phone]    Date of Service:  3/6/2025    Madhavi Hein is a 24 y.o. female who presents in office today with Self and Spouse.    Chief Complaint   Patient presents with    Ear Pain     Pt is here for ear pain,sore throat,cough. Pt states is started on Tuesday. Pt is pregnant, found out last week. Pt states she has an appointment with her OBGYN for 1 month.     Pharyngitis        Diagnoses / Plan:   1. Sore throat  -     POCT COVID-19 & Influenza A/B  -     amoxicillin (AMOXIL) 500 MG capsule; Take 1 capsule by mouth 2 times daily for 10 days, Disp-20 capsule, R-0Normal  2. Cough, unspecified type  -     POCT COVID-19 & Influenza A/B  -     amoxicillin (AMOXIL) 500 MG capsule; Take 1 capsule by mouth 2 times daily for 10 days, Disp-20 capsule, R-0Normal  3. Non-recurrent acute suppurative otitis media of right ear without spontaneous rupture of tympanic membrane  -     amoxicillin (AMOXIL) 500 MG capsule; Take 1 capsule by mouth 2 times daily for 10 days, Disp-20 capsule, R-0Normal   -Negative POCT testing.  Erythemic and bulging TM on the right side, will send in Amoxil to help with this.  May take Tylenol however no NSAIDs as she has a positive pregnancy.  Let office know if no improvement or worsening symptoms.    Medication sent to the pharmacy.  Discussed medication desired effects, potential side effects, and how to take the medication.  Encouraged symptomatic treatment, rest, increase oral fluid intake.  Follow-up for worsening or persistent symptoms.  Patient verbalizes understanding regarding plan of care and all questions answered.    Return if symptoms worsen or fail to improve.     Subjective:   History of Present Illness:  Madhavi is here today with cough and congestion with sore throat.  Her ear has been bothering her for about a week, muffled hearing, no drainage.  Cough is nonproductive, sore throat with some

## 2025-03-13 ASSESSMENT — ENCOUNTER SYMPTOMS
SHORTNESS OF BREATH: 0
ABDOMINAL PAIN: 0
CHEST TIGHTNESS: 0
SORE THROAT: 1
WHEEZING: 0
SINUS PRESSURE: 1
COLOR CHANGE: 0
COUGH: 1

## 2025-04-01 ENCOUNTER — HOSPITAL ENCOUNTER (OUTPATIENT)
Age: 25
Setting detail: SPECIMEN
Discharge: HOME | End: 2025-04-01
Payer: COMMERCIAL

## 2025-04-01 ENCOUNTER — OFFICE VISIT (OUTPATIENT)
Dept: FAMILY MEDICINE CLINIC | Age: 25
End: 2025-04-01
Payer: COMMERCIAL

## 2025-04-01 VITALS
HEART RATE: 80 BPM | SYSTOLIC BLOOD PRESSURE: 120 MMHG | WEIGHT: 177.8 LBS | DIASTOLIC BLOOD PRESSURE: 88 MMHG | RESPIRATION RATE: 18 BRPM | OXYGEN SATURATION: 99 % | BODY MASS INDEX: 31.5 KG/M2 | TEMPERATURE: 98.9 F

## 2025-04-01 DIAGNOSIS — N30.00 ACUTE CYSTITIS WITHOUT HEMATURIA: ICD-10-CM

## 2025-04-01 DIAGNOSIS — N30.00 ACUTE CYSTITIS WITHOUT HEMATURIA: Primary | ICD-10-CM

## 2025-04-01 DIAGNOSIS — R30.9 URINARY PAIN: ICD-10-CM

## 2025-04-01 LAB
BILIRUBIN, POC: NORMAL
BLOOD URINE, POC: NORMAL
CLARITY, POC: NORMAL
COLOR, POC: YELLOW
GLUCOSE URINE, POC: NORMAL MG/DL
KETONES, POC: NORMAL MG/DL
LEUKOCYTE EST, POC: NORMAL
NITRITE, POC: NORMAL
PH, POC: 8.5
PROTEIN, POC: NORMAL MG/DL
SPECIFIC GRAVITY, POC: 1.02
UROBILINOGEN, POC: 0.2 MG/DL

## 2025-04-01 PROCEDURE — 81002 URINALYSIS NONAUTO W/O SCOPE: CPT

## 2025-04-01 PROCEDURE — G8417 CALC BMI ABV UP PARAM F/U: HCPCS

## 2025-04-01 PROCEDURE — 87086 URINE CULTURE/COLONY COUNT: CPT

## 2025-04-01 PROCEDURE — 99213 OFFICE O/P EST LOW 20 MIN: CPT

## 2025-04-01 PROCEDURE — 1036F TOBACCO NON-USER: CPT

## 2025-04-01 PROCEDURE — G8427 DOCREV CUR MEDS BY ELIG CLIN: HCPCS

## 2025-04-01 RX ORDER — CEPHALEXIN 500 MG/1
500 CAPSULE ORAL 2 TIMES DAILY
Qty: 14 CAPSULE | Refills: 0 | Status: SHIPPED | OUTPATIENT
Start: 2025-04-01 | End: 2025-04-08

## 2025-04-01 SDOH — HEALTH STABILITY: PHYSICAL HEALTH: ON AVERAGE, HOW MANY MINUTES DO YOU ENGAGE IN EXERCISE AT THIS LEVEL?: 60 MIN

## 2025-04-01 SDOH — HEALTH STABILITY: PHYSICAL HEALTH: ON AVERAGE, HOW MANY DAYS PER WEEK DO YOU ENGAGE IN MODERATE TO STRENUOUS EXERCISE (LIKE A BRISK WALK)?: 4 DAYS

## 2025-04-01 NOTE — PROGRESS NOTES
[facility] department of [address] [address]  Phone: [phone]  Fax: [phone]    Madhavi Hein (:  2000) is a 24 y.o. female,Established patient, here for evaluation of the following chief complaint(s):  Urinary Pain (Started yesterday around 3 and then was gone by the time she got off work, Pt did use monistat)      Assessment and Plan     Diagnoses and all orders for this visit:  Acute cystitis without hematuria  -     Culture, Urine; Future  -     cephALEXin (KEFLEX) 500 MG capsule; Take 1 capsule by mouth 2 times daily for 7 days  Urinary pain  -     POCT Urinalysis no Micro    Urine dipstick shows small leukocytes, negative nitrate, negative blood.  Will treat with Keflex pending urine culture.  Condition was discussed in detail with patient.  Keflex was sent to pharmacy and patient was instructed on use, administration, and side effects.  She was instructed on supportive care and to increase her oral fluid intake.  She was instructed that we will follow-up upon urine culture results.  Instructed to follow-up as scheduled with OB.  Instructed to return to office if no improvement with antibiotics or sooner for worsening symptoms.  Instructed to go to ER if she begins having fever, chills, body aches, abdominal pain, or flank pain.  She verbalized understanding to the plan of care.  Return if symptoms worsen or fail to improve.      Discussed exam, POCT findings, plan of care, and follow-up at length with patient and/or their caregiver. Reviewed all prescribed and recommended medications, administration and side effects. All questions were addressed and answered with verbalization of understanding. The patient and/or the caregiver was agreeable with the plan.   Subjective:   Patient is a 24-year-old female who presents to office today for chief complaint of dysuria.  She states symptoms started yesterday at 3 PM.  She began

## 2025-04-03 LAB
MICROORGANISM SPEC CULT: NORMAL
SERVICE CMNT-IMP: NORMAL
SPECIMEN DESCRIPTION: NORMAL

## 2025-04-07 ASSESSMENT — ENCOUNTER SYMPTOMS
NAUSEA: 0
SHORTNESS OF BREATH: 0
DIARRHEA: 0
ABDOMINAL PAIN: 0
COUGH: 0
CONSTIPATION: 0
VOMITING: 0
SORE THROAT: 0